# Patient Record
Sex: MALE | Race: WHITE | NOT HISPANIC OR LATINO | Employment: FULL TIME | URBAN - METROPOLITAN AREA
[De-identification: names, ages, dates, MRNs, and addresses within clinical notes are randomized per-mention and may not be internally consistent; named-entity substitution may affect disease eponyms.]

---

## 2017-06-26 ENCOUNTER — APPOINTMENT (OUTPATIENT)
Dept: PHYSICAL THERAPY | Facility: CLINIC | Age: 62
End: 2017-06-26
Payer: COMMERCIAL

## 2017-06-26 PROCEDURE — 97162 PT EVAL MOD COMPLEX 30 MIN: CPT

## 2017-06-28 ENCOUNTER — APPOINTMENT (OUTPATIENT)
Dept: PHYSICAL THERAPY | Facility: CLINIC | Age: 62
End: 2017-06-28
Payer: COMMERCIAL

## 2017-06-28 PROCEDURE — 97110 THERAPEUTIC EXERCISES: CPT

## 2017-06-28 PROCEDURE — 97010 HOT OR COLD PACKS THERAPY: CPT

## 2017-06-28 PROCEDURE — 97140 MANUAL THERAPY 1/> REGIONS: CPT

## 2017-06-29 ENCOUNTER — APPOINTMENT (OUTPATIENT)
Dept: PHYSICAL THERAPY | Facility: CLINIC | Age: 62
End: 2017-06-29
Payer: COMMERCIAL

## 2017-06-29 PROCEDURE — 97140 MANUAL THERAPY 1/> REGIONS: CPT

## 2017-06-29 PROCEDURE — 97110 THERAPEUTIC EXERCISES: CPT

## 2017-06-29 PROCEDURE — 97010 HOT OR COLD PACKS THERAPY: CPT

## 2018-07-03 ENCOUNTER — TELEPHONE (OUTPATIENT)
Dept: PAIN MEDICINE | Facility: MEDICAL CENTER | Age: 63
End: 2018-07-03

## 2018-07-03 NOTE — TELEPHONE ENCOUNTER
New pt being referred by Dr Tiffanie Asencio from [address]  for back pain  Dr Kenneth Saucedo will be faxing over referral  Pt has not seen previous pain management  Pt is being referred to Dr Lynn Ashby but is not sure if he wants to be sooner by Dr Karson Marino or wait until end of month for first availability for Dr Lynn Ashby

## 2018-07-06 NOTE — TELEPHONE ENCOUNTER
Pt called and left  7/5 @ 4:09p stating the referral was faxed over and if someone can give him a call so he can make an appt  Please advise   Pt can be reached at [phone]

## 2018-07-23 NOTE — TELEPHONE ENCOUNTER
Info was never received so I called over to Dr Favian Duarte office [phone] s/w Saleem Sales she will let the doctor know we need and order and notes   Back fax number provided